# Patient Record
Sex: MALE | Race: AMERICAN INDIAN OR ALASKA NATIVE
[De-identification: names, ages, dates, MRNs, and addresses within clinical notes are randomized per-mention and may not be internally consistent; named-entity substitution may affect disease eponyms.]

---

## 2022-09-15 ENCOUNTER — HOSPITAL ENCOUNTER (OUTPATIENT)
Dept: HOSPITAL 5 - XRAY | Age: 37
Discharge: HOME | End: 2022-09-15
Attending: INTERNAL MEDICINE
Payer: COMMERCIAL

## 2022-09-15 DIAGNOSIS — M25.542: ICD-10-CM

## 2022-09-15 DIAGNOSIS — M25.561: ICD-10-CM

## 2022-09-15 DIAGNOSIS — Z02.71: Primary | ICD-10-CM

## 2022-09-15 DIAGNOSIS — M25.541: ICD-10-CM

## 2022-09-15 DIAGNOSIS — M25.572: ICD-10-CM

## 2022-09-15 DIAGNOSIS — M25.571: ICD-10-CM

## 2022-09-15 DIAGNOSIS — M25.562: ICD-10-CM

## 2022-09-15 NOTE — XRAY REPORT
Bilateral hands-3 views each



INDICATION:  BILATERAL HAND PAIN.



COMPARISON:  None available.





IMPRESSION:  No acute osseous abnormality.  Normal alignment. No significant DJD. No bone erosions id
entified. Soft tissues are unremarkable.



Signer Name: Paul Charlton MD 

Signed: 9/15/2022 12:03 PM

Workstation Name: [id number]

## 2022-09-15 NOTE — XRAY REPORT
Bilateral ankles-4 total views



INDICATION:  BILATERAL ANKLE PAIN.



COMPARISON:  None available.





IMPRESSION:  No acute osseous abnormality.  Normal alignment. No significant DJD. No bone erosions id
entified. Soft tissues are unremarkable.



Signer Name: Paul Charlton MD 

Signed: 9/15/2022 12:02 PM

Workstation Name: [id number]

## 2022-09-15 NOTE — XRAY REPORT
Bilateral knees-6 total views



INDICATION:  BILATERAL KNEE PAIN.



COMPARISON:  None available.





IMPRESSION:  No acute osseous abnormality.  Normal alignment. No significant DJD. No bone erosions id
entified. Soft tissues are unremarkable.



Signer Name: Paul Charlton MD 

Signed: 9/15/2022 12:02 PM

Workstation Name: [id number]